# Patient Record
Sex: FEMALE | Race: WHITE | NOT HISPANIC OR LATINO | Employment: PART TIME | URBAN - NONMETROPOLITAN AREA
[De-identification: names, ages, dates, MRNs, and addresses within clinical notes are randomized per-mention and may not be internally consistent; named-entity substitution may affect disease eponyms.]

---

## 2017-04-12 ENCOUNTER — HOSPITAL ENCOUNTER (EMERGENCY)
Facility: HOSPITAL | Age: 22
Discharge: HOME | End: 2017-04-12
Attending: EMERGENCY MEDICINE | Admitting: EMERGENCY MEDICINE

## 2017-04-12 ENCOUNTER — APPOINTMENT (OUTPATIENT)
Dept: ULTRASOUND IMAGING | Facility: HOSPITAL | Age: 22
End: 2017-04-12

## 2017-04-12 VITALS
WEIGHT: 150 LBS | HEIGHT: 65 IN | SYSTOLIC BLOOD PRESSURE: 127 MMHG | RESPIRATION RATE: 16 BRPM | HEART RATE: 87 BPM | DIASTOLIC BLOOD PRESSURE: 80 MMHG | OXYGEN SATURATION: 100 % | TEMPERATURE: 98 F | BODY MASS INDEX: 24.99 KG/M2

## 2017-04-12 DIAGNOSIS — N93.9 VAGINAL BLEEDING PROBLEMS: Primary | ICD-10-CM

## 2017-04-12 LAB
B-HCG UR QL: NEGATIVE
BACTERIA UR QL AUTO: ABNORMAL /HPF
BILIRUB UR QL STRIP: NEGATIVE
CLARITY UR: CLEAR
COLOR UR: YELLOW
GLUCOSE UR STRIP-MCNC: NEGATIVE MG/DL
HGB UR QL STRIP.AUTO: ABNORMAL
HYALINE CASTS UR QL AUTO: ABNORMAL /LPF
KETONES UR QL STRIP: NEGATIVE
LEUKOCYTE ESTERASE UR QL STRIP.AUTO: NEGATIVE
NITRITE UR QL STRIP: NEGATIVE
PH UR STRIP.AUTO: 6 [PH] (ref 5–8)
PROT UR QL STRIP: NEGATIVE
RBC # UR: ABNORMAL /HPF
REF LAB TEST METHOD: ABNORMAL
SP GR UR STRIP: 1.01 (ref 1–1.03)
SQUAMOUS #/AREA URNS HPF: ABNORMAL /HPF
UROBILINOGEN UR QL STRIP: ABNORMAL
WBC UR QL AUTO: ABNORMAL /HPF

## 2017-04-12 PROCEDURE — 99283 EMERGENCY DEPT VISIT LOW MDM: CPT

## 2017-04-12 PROCEDURE — 81001 URINALYSIS AUTO W/SCOPE: CPT | Performed by: EMERGENCY MEDICINE

## 2017-04-12 PROCEDURE — 81025 URINE PREGNANCY TEST: CPT | Performed by: EMERGENCY MEDICINE

## 2017-04-12 PROCEDURE — 76830 TRANSVAGINAL US NON-OB: CPT

## 2017-04-12 RX ORDER — DOXYCYCLINE HYCLATE 100 MG/1
100 TABLET, DELAYED RELEASE ORAL 2 TIMES DAILY
Qty: 14 TABLET | Refills: 0 | Status: SHIPPED | OUTPATIENT
Start: 2017-04-12 | End: 2017-04-21

## 2017-04-12 RX ORDER — DEXTROAMPHETAMINE SACCHARATE, AMPHETAMINE ASPARTATE, DEXTROAMPHETAMINE SULFATE AND AMPHETAMINE SULFATE 2.5; 2.5; 2.5; 2.5 MG/1; MG/1; MG/1; MG/1
10 TABLET ORAL DAILY
COMMUNITY
End: 2018-02-09 | Stop reason: DRUGHIGH

## 2017-04-12 RX ORDER — LEVOTHYROXINE AND LIOTHYRONINE 19; 4.5 UG/1; UG/1
30 TABLET ORAL DAILY
COMMUNITY
End: 2018-02-09 | Stop reason: DRUGHIGH

## 2017-04-12 NOTE — ED PROVIDER NOTES
Subjective   HPI Comments: Patient is here with complaint of some vaginal bleeding after intercourse last night patient states this is happened off and on for the past 2 or 3 months states her menstrual cycle ended last week patient states she feels like she is not having any more bleeding currently but this did occur after midnight abdominal pain no fevers chills denies pregnancy  presents here for further evaluation      Review of Systems   Constitutional: Negative.  Negative for chills and fever.   HENT: Negative.    Respiratory: Negative.    Gastrointestinal: Negative.    Genitourinary: Positive for vaginal bleeding. Negative for vaginal discharge.   Musculoskeletal: Negative.    Neurological: Negative.    Psychiatric/Behavioral: The patient is nervous/anxious.    All other systems reviewed and are negative.      Past Medical History:   Diagnosis Date   • Anxiety    • Depression    • Disease of thyroid gland    • Panic attacks        No Known Allergies    History reviewed. No pertinent surgical history.    History reviewed. No pertinent family history.    Social History     Social History   • Marital status: Single     Spouse name: N/A   • Number of children: N/A   • Years of education: N/A     Social History Main Topics   • Smoking status: Current Some Day Smoker   • Smokeless tobacco: None   • Alcohol use Yes   • Drug use: No   • Sexual activity: Not Asked     Other Topics Concern   • None     Social History Narrative   • None           Objective   Physical Exam   Constitutional: She appears well-developed and well-nourished.   Afebrile vital signs stable no acute distress nontoxic well-appearing   Eyes: Pupils are equal, round, and reactive to light.   Neck: Normal range of motion.   Cardiovascular: Regular rhythm.    Pulmonary/Chest: Effort normal and breath sounds normal.   Abdominal: Soft. There is no tenderness.   Genitourinary: Vagina normal. No vaginal discharge found.   Genitourinary Comments: Vaginal  exam unremarkable no discharge no tenderness no bleeding   Neurological: She is alert.   Skin: Skin is warm. No rash noted.   Nursing note and vitals reviewed.      Procedures         ED Course  ED Course   Comment By Time   Patient resting comfortably will have patient follow-up with Dr. Spaulding return here for any sudden changes worsening, pain bleeding or any concern,.. Advised no intercourse until GYN follow-up Hermilo Cavazos PA-C 04/12 1213   Discussed with Dr. Spaulding he will follow up with patient have patient call office for follow-up recommends starting her on doxycycline twice a day for 7 days Hermilo Cavazos PA-C 04/12 1216                  MDM  Number of Diagnoses or Management Options  Vaginal bleeding problems:       Final diagnoses:   Vaginal bleeding problems            Hermilo Cavazos PA-C  04/12/17 1218

## 2017-04-21 ENCOUNTER — RESULTS ENCOUNTER (OUTPATIENT)
Dept: OBSTETRICS AND GYNECOLOGY | Facility: CLINIC | Age: 22
End: 2017-04-21

## 2017-04-21 ENCOUNTER — OFFICE VISIT (OUTPATIENT)
Dept: OBSTETRICS AND GYNECOLOGY | Facility: CLINIC | Age: 22
End: 2017-04-21

## 2017-04-21 VITALS
BODY MASS INDEX: 24.83 KG/M2 | WEIGHT: 149 LBS | HEIGHT: 65 IN | DIASTOLIC BLOOD PRESSURE: 62 MMHG | SYSTOLIC BLOOD PRESSURE: 118 MMHG

## 2017-04-21 DIAGNOSIS — N93.0 POSTCOITAL BLEEDING: Primary | ICD-10-CM

## 2017-04-21 DIAGNOSIS — N93.0 POSTCOITAL BLEEDING: ICD-10-CM

## 2017-04-21 DIAGNOSIS — N72 CERVICITIS: ICD-10-CM

## 2017-04-21 PROCEDURE — 99204 OFFICE O/P NEW MOD 45 MIN: CPT | Performed by: OBSTETRICS & GYNECOLOGY

## 2017-04-21 RX ORDER — NORGESTIMATE AND ETHINYL ESTRADIOL 7DAYSX3 28
1 KIT ORAL DAILY
COMMUNITY
End: 2018-03-02 | Stop reason: ALTCHOICE

## 2017-04-21 RX ORDER — DOXYCYCLINE HYCLATE 100 MG/1
100 CAPSULE ORAL 2 TIMES DAILY
Qty: 14 CAPSULE | Refills: 0 | Status: SHIPPED | OUTPATIENT
Start: 2017-04-21 | End: 2017-04-28

## 2017-04-21 NOTE — PATIENT INSTRUCTIONS
Endometritis  Endometritis is an irritation, soreness, and swelling (inflammation) of the lining of the uterus (endometrium).   CAUSES   · Bacterial infections.  · Sexually transmitted infections (STIs).  · Having a miscarriage or childbirth, especially after a long labor or  delivery.  · Certain gynecological procedures (such as dilation and curettage, hysteroscopy, or contraceptive insertion).  SIGNS AND SYMPTOMS   · Fever.  · Lower abdominal or pelvic pain.  · Abnormal vaginal discharge or bleeding.  · Abdominal bloating (distention) or swelling.  · General discomfort or ill feeling.  · Discomfort with bowel movements.  DIAGNOSIS   A physical and pelvic exam are performed. Other tests may include:  · Cultures from the cervix.  · Blood tests.  · Examining a tissue sample of the uterine lining (endometrial biopsy).  · Examining discharge under a microscope (wet prep).  · Laparoscopy.  TREATMENT   Antibiotic medicines are usually given. Other treatments may include:  · Fluids through an IV tube inserted in your vein.  · Rest.  HOME CARE INSTRUCTIONS   · Take over-the-counter or prescription medicines for pain, discomfort, or fever as directed by your health care provider.  · Take your antibiotics as directed. Finish them even if you start to feel better.  · Resume your normal diet and activities as directed or as tolerated.  · Do not douche or have sexual intercourse until your health care provider says it is okay.  · Do not have sexual intercourse until your partner has been treated if your endometritis is caused by an STI.  SEEK IMMEDIATE MEDICAL CARE IF:   · You have swelling or increasing pain in the abdomen.  · You have a fever.  · You have bad smelling vaginal discharge, or you have an increased amount of discharge.  · You have abnormal vaginal bleeding.  · Your medicine is not helping with the pain.  · You experience any problems that may be related to the medicine you are taking.  · You have nausea  and vomiting, or you cannot keep foods down.  · You have pain with bowel movements.  MAKE SURE YOU:   · Understand these instructions.  · Will watch your condition.  · Will get help right away if you are not doing well or get worse.     This information is not intended to replace advice given to you by your health care provider. Make sure you discuss any questions you have with your health care provider.     Document Released: 12/12/2002 Document Revised: 08/20/2014 Document Reviewed: 07/17/2014  Elsevier Interactive Patient Education ©2016 [employer]

## 2017-04-22 NOTE — PROGRESS NOTES
Subjective  Chief Complaint   Patient presents with   • Vaginal Bleeding     ER Followup. Patient advised started bleeding during intercourse and was extremely heavy afterwards, patient advised bleeding lasted through the night and stopped. Paitent advised had some slight pain during intercourse.      Patient is 21 y.o.  here for evaluation of postcoital bleeding.  Pt has been on oral contraceptives since .  Pt with same partner; no new sexual partners.  Pt reports have postcoital bleeding starting in December.  Bleeding has been intermittent but always associated with intercourse.  Pt had recent episode 4/ of heavy, bright red bleeding postcoital requiring patient to go to ER.  Pt seen in ER; had pelvic exam done, ua, and TVS.  Records reviewed and showed normal UA, UPT negative.  TVS showed normal sized uterus 7.5x3.1x4.1 cm; ET 3.2mm; ovaries normal; no free fluid.  Pt given doxycycline x 1 wk; completed on Wednesday.  Pt did not have any cultures done.  Pt reports no vaginal discharge, itching, odor.  Pt with no fever or chills.  Pt did have pain with intercourse but not severe.  Pt on 3rd row of ocps.  Pt with no other symptoms or complaints.  Pt has never had pap smear before.  Pt with no family history of gyn malignancy.    History  Past Medical History:   Diagnosis Date   • Anemia    • Anxiety    • Depression    • Depression    • Disease of thyroid gland    • Panic attacks    • Recurrent urinary tract infection      Current Outpatient Prescriptions on File Prior to Visit   Medication Sig Dispense Refill   • amphetamine-dextroamphetamine (ADDERALL) 10 MG tablet Take 10 mg by mouth Daily.     • Thyroid 30 MG PO tablet Take 30 mg by mouth Daily.       No current facility-administered medications on file prior to visit.      No Known Allergies  Past Surgical History:   Procedure Laterality Date   • WISDOM TOOTH EXTRACTION  2016     Family History   Problem Relation Age of Onset   • Thyroid  "disease Mother    • Thyroid disease Sister    • Hyperlipidemia Paternal Grandfather    • Diabetes Paternal Grandfather      Social History     Social History   • Marital status: Single     Spouse name: N/A   • Number of children: N/A   • Years of education: N/A     Social History Main Topics   • Smoking status: Never Smoker   • Smokeless tobacco: Never Used   • Alcohol use Yes      Comment: 1-2 per week   • Drug use: No   • Sexual activity: Yes     Partners: Male     Birth control/ protection: OCP     Other Topics Concern   • None     Social History Narrative     Review of Systems  All systems were reviewed and negative except for:  Constitution:  positive for trouble sleeping  Breast:  positive for tenderness  Hematologic / Lymphatic: positive for  anemia    Objective  Vitals:    04/21/17 0930   BP: 118/62   Weight: 149 lb (67.6 kg)   Height: 65\" (165.1 cm)     Physical Exam:  General Appearance: alert, appears stated age and cooperative  Head: normocephalic, without obvious abnormality and atraumatic  Neck: suppple, trachea midline and no thyromegaly  Lungs: clear to auscultation, respirations regular, respirations even and respirations unlabored  Heart: regular rhythm and normal rate, normal S1, S2, no murmur, gallop, or rubs and no click  Abdomen: normal bowel sounds, no masses, no hepatomegaly, no splenomegaly, soft non-tender, no guarding and no rebound tenderness  Pelvic: Clinical staff was present for exam  External genitalia:  normal appearance of the external genitalia including Bartholin's and Adamson's glands.  :  urethral meatus normal; urethral hypermobility is absent.  Vaginal:  normal pink mucosa without prolapse or lesions.  Cervix:  normal appearance. friable;  Uterus:  normal size, shape and consistency.  Adnexa:  normal bimanual exam of the adnexa.  Extremities: moves extremities well, no edema, no cyanosis and no redness  Skin: no bleeding, bruising or rash and no lesions noted  Psych: normal " mood and affect, oriented to person, time and place, thought content organized and appropriate judgment    Lab Review   UPT and UA    Imaging   Pelvic ultrasound report reviewed as well as images with findings noted as above 4/12/2017  Assessment/Plan    Problem List Items Addressed This Visit     None      Visit Diagnoses     Postcoital bleeding    -  Primary  Pt with new onset postcoital bleeding.  Cervix slightly friable on examination.  Cultures and pap done given symptoms.  Rx Doxycycline given x 7 d for total 14 days treatment for presumed cervicitis/endometritis.  Pt to f/u post treatment.  Instructions and precautions given.  All questions answered.    Relevant Medications    doxycycline (VIBRAMYCIN) 100 MG capsule    Other Relevant Orders    Chlamydia trachomatis, Neisseria gonorrhoeae, Trichomonas vaginalis, PCR    Bacterial Vaginosis, EFRA    Candida panel, PCR    Pap IG, Rfx HPV ASCU          Follow up 2-3 weeks for annual exam and for recheck of postcoital bleeding    This note was electronically signed.  Ciara Sotelo M.D.

## 2017-04-24 ENCOUNTER — RESULTS ENCOUNTER (OUTPATIENT)
Dept: OBSTETRICS AND GYNECOLOGY | Facility: CLINIC | Age: 22
End: 2017-04-24

## 2017-04-24 DIAGNOSIS — N93.0 POSTCOITAL BLEEDING: ICD-10-CM

## 2017-04-28 ENCOUNTER — TELEPHONE (OUTPATIENT)
Dept: OBSTETRICS AND GYNECOLOGY | Facility: CLINIC | Age: 22
End: 2017-04-28

## 2017-04-28 NOTE — TELEPHONE ENCOUNTER
Media Information    File:               Notice:         Need to inform pt cultures +Ureaplasma; needs rx doxycycline 100mg po bid x 10 and need to document in chart.         ----- Message -----            From: Marguerite Neumann            Sent: 4/26/2017   2:59 PM              To: Ciara Chacon MD         Subject: Edit                                                           The scan below was edited by Marguerite Neumann [[id number]] on 4/26/2017 at 2:59 PM; it is attached to the following: Candida panel, PCR on 4/21/2017.                  Description        BV, YEAST, STI CULTURE         Patient        Ivy Gomes         Document Type        LABORATORY - SCAN         Scan on 4/26/2017  2:59 PM by Marguerite Neumann : BV, YEAST, STI CULTURE          PER DR CHACON

## 2017-04-28 NOTE — TELEPHONE ENCOUNTER
Spoke with patient and informed, she advised was already on a round of Doxycycline before visit and was given another when seen for visit.

## 2017-05-03 DIAGNOSIS — N93.0 POSTCOITAL BLEEDING: ICD-10-CM

## 2017-05-17 ENCOUNTER — OFFICE VISIT (OUTPATIENT)
Dept: OBSTETRICS AND GYNECOLOGY | Facility: CLINIC | Age: 22
End: 2017-05-17

## 2017-05-17 VITALS
DIASTOLIC BLOOD PRESSURE: 60 MMHG | SYSTOLIC BLOOD PRESSURE: 116 MMHG | WEIGHT: 146 LBS | BODY MASS INDEX: 24.32 KG/M2 | HEIGHT: 65 IN

## 2017-05-17 DIAGNOSIS — N93.0 POSTCOITAL BLEEDING: Primary | ICD-10-CM

## 2017-05-17 DIAGNOSIS — N72 CERVICITIS: ICD-10-CM

## 2017-05-17 PROCEDURE — 99213 OFFICE O/P EST LOW 20 MIN: CPT | Performed by: OBSTETRICS & GYNECOLOGY

## 2018-02-09 ENCOUNTER — OFFICE VISIT (OUTPATIENT)
Dept: OBSTETRICS AND GYNECOLOGY | Facility: CLINIC | Age: 23
End: 2018-02-09

## 2018-02-09 VITALS
WEIGHT: 154 LBS | HEIGHT: 65 IN | BODY MASS INDEX: 25.66 KG/M2 | DIASTOLIC BLOOD PRESSURE: 60 MMHG | SYSTOLIC BLOOD PRESSURE: 119 MMHG

## 2018-02-09 DIAGNOSIS — N92.6 IRREGULAR BLEEDING: Primary | ICD-10-CM

## 2018-02-09 DIAGNOSIS — N94.6 DYSMENORRHEA: ICD-10-CM

## 2018-02-09 DIAGNOSIS — N93.0 POSTCOITAL BLEEDING: ICD-10-CM

## 2018-02-09 PROCEDURE — 99214 OFFICE O/P EST MOD 30 MIN: CPT | Performed by: OBSTETRICS & GYNECOLOGY

## 2018-02-09 RX ORDER — DESOGESTREL AND ETHINYL ESTRADIOL 0.15-0.03
KIT ORAL
Refills: 0 | COMMUNITY
Start: 2018-02-07 | End: 2018-03-02

## 2018-02-09 RX ORDER — ONDANSETRON HYDROCHLORIDE 8 MG/1
8 TABLET, FILM COATED ORAL EVERY 8 HOURS PRN
Qty: 30 TABLET | Refills: 0 | Status: SHIPPED | OUTPATIENT
Start: 2018-02-09

## 2018-02-09 RX ORDER — PROPRANOLOL HYDROCHLORIDE 20 MG/1
TABLET ORAL
Refills: 1 | COMMUNITY
Start: 2018-02-01

## 2018-02-09 RX ORDER — DEXTROAMPHETAMINE SACCHARATE, AMPHETAMINE ASPARTATE, DEXTROAMPHETAMINE SULFATE AND AMPHETAMINE SULFATE 5; 5; 5; 5 MG/1; MG/1; MG/1; MG/1
TABLET ORAL
Refills: 0 | COMMUNITY
Start: 2018-02-05

## 2018-02-09 RX ORDER — NORGESTIMATE AND ETHINYL ESTRADIOL 0.25-0.035
1 KIT ORAL DAILY
Qty: 1 PACKAGE | Refills: 12 | Status: SHIPPED | OUTPATIENT
Start: 2018-02-09

## 2018-02-09 RX ORDER — LEVOTHYROXINE, LIOTHYRONINE 38; 9 UG/1; UG/1
TABLET ORAL
Refills: 0 | COMMUNITY
Start: 2017-11-22

## 2018-02-09 NOTE — PROGRESS NOTES
Subjective  Chief Complaint   Patient presents with   • Follow-up     TRANSVAGINAL ULTRASOUND, OVARIAN CYST,THICKENED ENDOMETRIUM.      Patient is 22 y.o.  here for evaluation of irregular bleeding and pain.  Pt has been on Trisprintec x 4 years.  Pt on menses now;  menses started and still bleeding; 3rd week of pill pack.  Pt had menses in December lasted 10 days.  Previously menses have been lasting 5-6 days.  Pt reports bleeding is  heavy changing pad q 2 hours.  Pt also reports severe cramping.  Pt seen at [facility] and told may have thickened endometrium or cyst.  Pt has not had a scan or any testing done.  Pt given new rx for ocps but has not gotten filled yet.  Pt also reports onset of postcoital bleeding since December.  Pt had in past with +ureaplasma and treated with antibiotics with resolution.  Pt denies any vaginal discharge, itching, burning, or odor.  Pt with no fever or chills.    History  Past Medical History:   Diagnosis Date   • Anemia    • Anxiety    • Depression    • Depression    • Disease of thyroid gland    • Panic attacks    • Recurrent urinary tract infection      Current Outpatient Prescriptions on File Prior to Visit   Medication Sig Dispense Refill   • norgestimate-ethinyl estradiol (TRI-SPRINTEC) 0.18/0.215/0.25 MG-35 MCG per tablet Take 1 tablet by mouth Daily.     • [DISCONTINUED] amphetamine-dextroamphetamine (ADDERALL) 10 MG tablet Take 10 mg by mouth Daily.     • [DISCONTINUED] Thyroid 30 MG PO tablet Take 30 mg by mouth Daily.       No current facility-administered medications on file prior to visit.      No Known Allergies  Past Surgical History:   Procedure Laterality Date   • WISDOM TOOTH EXTRACTION  2016     Family History   Problem Relation Age of Onset   • Thyroid disease Mother    • Thyroid disease Sister    • Hyperlipidemia Paternal Grandfather    • Diabetes Paternal Grandfather      Social History     Social History   • Marital status: Single      "Spouse name: N/A   • Number of children: N/A   • Years of education: N/A     Social History Main Topics   • Smoking status: Never Smoker   • Smokeless tobacco: Never Used   • Alcohol use Yes      Comment: 1-2 per week   • Drug use: No   • Sexual activity: Yes     Partners: Male     Birth control/ protection: OCP     Other Topics Concern   • None     Social History Narrative     Review of Systems  All systems were reviewed and negative except for:  Genitourinary: postivie for  abnormal menstrual bleeding     Objective  Vitals:    02/09/18 1412   BP: 119/60   Weight: 69.9 kg (154 lb)   Height: 165.1 cm (65\")     Physical Exam:  General Appearance: alert, appears stated age and cooperative  Head: normocephalic, without obvious abnormality and atraumatic  Eyes: lids and lashes normal, conjunctivae and sclerae normal, no icterus, no pallor, corneas clear and PERRLA  Ears: ears appear intact with no abnormalities noted  Nose: nares normal, septum midline, mucosa normal and no drainage  Neck: suppple, trachea midline and no thyromegaly  Lungs: clear to auscultation, respirations regular, respirations even and respirations unlabored  Heart: regular rhythm and normal rate, normal S1, S2, no murmur, gallop, or rubs and no click  Breasts: Not performed.  Abdomen: normal bowel sounds, no masses, no hepatomegaly, no splenomegaly, soft non-tender, no guarding and no rebound tenderness  Pelvic: Not performed.  Extremities: moves extremities well, no edema, no cyanosis and no redness  Skin: no bleeding, bruising or rash and no lesions noted  Lymph Nodes: no palpable adenopathy  Psych: normal mood and affect, oriented to person, time and place, thought content organized and appropriate judgment    Lab Review   No data reviewed    Imaging   Pelvic ultrasound images independantly reviewed - TVS today shows normal uterus; ET 6.8 mm; bilateral ovaries with multiple follicles and normal vascular flow; no free fluid " seen.    Assessment/Plan    Problem List Items Addressed This Visit     None      Visit Diagnoses     Irregular bleeding    -  Primary  Scan done today; reassuring.  Rx Orthocyclen given as noted; pt to do taper with 2 pills x 3 d then 1 daily.  Rx Zofran given as well.      Relevant Orders    US Non-ob Transvaginal    Dysmenorrhea      See plan above    Relevant Orders    US Non-ob Transvaginal    Postcoital bleeding      Pt to return for cultures when not bleeding.            Follow up as noted     This note was electronically signed.  Ciara Sotelo M.D.

## 2018-03-02 ENCOUNTER — OFFICE VISIT (OUTPATIENT)
Dept: OBSTETRICS AND GYNECOLOGY | Facility: CLINIC | Age: 23
End: 2018-03-02

## 2018-03-02 VITALS
WEIGHT: 154 LBS | DIASTOLIC BLOOD PRESSURE: 58 MMHG | BODY MASS INDEX: 25.66 KG/M2 | SYSTOLIC BLOOD PRESSURE: 118 MMHG | HEIGHT: 65 IN

## 2018-03-02 DIAGNOSIS — N93.0 POSTCOITAL BLEEDING: ICD-10-CM

## 2018-03-02 DIAGNOSIS — N94.6 DYSMENORRHEA: ICD-10-CM

## 2018-03-02 DIAGNOSIS — N92.6 IRREGULAR BLEEDING: Primary | ICD-10-CM

## 2018-03-02 PROCEDURE — 99213 OFFICE O/P EST LOW 20 MIN: CPT | Performed by: OBSTETRICS & GYNECOLOGY

## 2018-03-02 NOTE — PROGRESS NOTES
Subjective  Chief Complaint   Patient presents with   • Follow-up     FOLLOW UP IRREGULAR BLEEDING, VAGINAL CULTURES.      Patient is 22 y.o.  here for f/u regarding irregular bleeding.  Pt had been on TriSprintec x 4 years with onset of heavy, irregular, prolonged bleeding.  Ocps were changed to Orthocyclen.  Pt also did ocp taper.  Pt reports the bleeding has stopped.  Pt has not had any more postcoital bleeding either.  Pt due for menses now; pt on placebo pills.  Pt has tolerated the pills well without any problems.    History  Past Medical History:   Diagnosis Date   • Anemia    • Anxiety    • Depression    • Depression    • Disease of thyroid gland    • Irregular bleeding    • Oral contraceptive use    • Panic attacks    • Recurrent urinary tract infection      Current Outpatient Prescriptions on File Prior to Visit   Medication Sig Dispense Refill   • amphetamine-dextroamphetamine (ADDERALL) 20 MG tablet   0   • norgestimate-ethinyl estradiol (ORTHO-CYCLEN, 28,) 0.25-35 MG-MCG per tablet Take 1 tablet by mouth Daily. 1 package 12   • NP THYROID 60 MG tablet   0   • ondansetron (ZOFRAN) 8 MG tablet Take 1 tablet by mouth Every 8 (Eight) Hours As Needed for Nausea or Vomiting. 30 tablet 0   • propranolol (INDERAL) 20 MG tablet   1   • Vortioxetine HBr (TRINTELLIX) 10 MG tablet Take 10 mg by mouth Daily.     • [DISCONTINUED] ENSKYCE 0.15-30 MG-MCG per tablet   0   • [DISCONTINUED] norgestimate-ethinyl estradiol (TRI-SPRINTEC) 0.18/0.215/0.25 MG-35 MCG per tablet Take 1 tablet by mouth Daily.       No current facility-administered medications on file prior to visit.      No Known Allergies  Past Surgical History:   Procedure Laterality Date   • WISDOM TOOTH EXTRACTION  2016     Family History   Problem Relation Age of Onset   • Thyroid disease Mother    • Thyroid disease Sister    • Hyperlipidemia Paternal Grandfather    • Diabetes Paternal Grandfather      Social History     Social History   •  "Marital status: Single     Social History Main Topics   • Smoking status: Never Smoker   • Smokeless tobacco: Never Used   • Alcohol use Yes      Comment: 1-2 per week   • Drug use: No   • Sexual activity: Yes     Partners: Male     Birth control/ protection: OCP     Review of Systems  All systems were reviewed and negative except for:  Genitourinary: postivie for  abnormal menstrual bleeding     Objective  Vitals:    03/02/18 1541   BP: 118/58   Weight: 69.9 kg (154 lb)   Height: 165.1 cm (65\")     Physical Exam:  General Appearance: alert, appears stated age and cooperative  Head: normocephalic, without obvious abnormality and atraumatic  Eyes: lids and lashes normal, conjunctivae and sclerae normal, no icterus, no pallor, corneas clear and PERRLA  Ears: ears appear intact with no abnormalities noted  Nose: nares normal, septum midline, mucosa normal and no drainage  Neck: suppple, trachea midline and no thyromegaly  Lungs: clear to auscultation, respirations regular, respirations even and respirations unlabored  Heart: regular rhythm and normal rate, normal S1, S2, no murmur, gallop, or rubs and no click  Breasts: Not performed.  Abdomen: normal bowel sounds, no masses, no hepatomegaly, no splenomegaly, soft non-tender, no guarding and no rebound tenderness  Pelvic: Clinical staff was present for exam  External genitalia:  normal appearance of the external genitalia including Bartholin's and Wiggins's glands.  :  urethral meatus normal;  Vaginal:  normal pink mucosa without prolapse or lesions. blood present -  small amount and dark red;  Cervix:  normal appearance.  Uterus:  normal size, shape and consistency.  Adnexa:  normal bimanual exam of the adnexa.  Extremities: moves extremities well, no edema, no cyanosis and no redness  Skin: no bleeding, bruising or rash and no lesions noted  Lymph Nodes: no palpable adenopathy  Psych: normal mood and affect, oriented to person, time and place, thought content " organized and appropriate judgment    Lab Review   No data reviewed    Imaging   No data reviewed    Assessment/Plan    Problem List Items Addressed This Visit        Genitourinary    Irregular bleeding - Primary  Pt with irregular bleeding on Trisprintec.  Pt now on orthocyclen; due to start 2nd cycle.  Pt with history of ureplasma.  Cultures done today.  Pt to cont current ocp.  Instructions and precautions given.    Relevant Orders    NuSwab VG+ - Swab, Vagina      Other Visit Diagnoses     Dysmenorrhea      Pt reports improvement in pain as well.  Pt on placebos now.  Pt to cont current pill.    Postcoital bleeding    Improved  Patient reports improvement in postcoital bleeding.  Cultures done.            Follow up 3-4 months    This note was electronically signed.  Ciara Sotelo M.D.

## 2018-03-07 LAB
A VAGINAE DNA VAG QL NAA+PROBE: NORMAL SCORE
BVAB2 DNA VAG QL NAA+PROBE: NORMAL SCORE
C ALBICANS DNA VAG QL NAA+PROBE: NEGATIVE
C GLABRATA DNA VAG QL NAA+PROBE: NEGATIVE
C TRACH RRNA SPEC QL NAA+PROBE: NEGATIVE
MEGA1 DNA VAG QL NAA+PROBE: NORMAL SCORE
N GONORRHOEA RRNA SPEC QL NAA+PROBE: NEGATIVE
T VAGINALIS RRNA SPEC QL NAA+PROBE: NEGATIVE